# Patient Record
Sex: FEMALE | Race: WHITE | NOT HISPANIC OR LATINO | Employment: OTHER | URBAN - METROPOLITAN AREA
[De-identification: names, ages, dates, MRNs, and addresses within clinical notes are randomized per-mention and may not be internally consistent; named-entity substitution may affect disease eponyms.]

---

## 2019-11-19 ENCOUNTER — HISTORICAL (OUTPATIENT)
Dept: ADMINISTRATIVE | Facility: HOSPITAL | Age: 39
End: 2019-11-19

## 2019-11-21 LAB — FINAL CULTURE: NORMAL

## 2019-11-22 ENCOUNTER — HOSPITAL ENCOUNTER (OUTPATIENT)
Dept: OBSTETRICS AND GYNECOLOGY | Facility: HOSPITAL | Age: 39
End: 2019-11-23
Attending: OBSTETRICS & GYNECOLOGY | Admitting: OBSTETRICS & GYNECOLOGY

## 2019-11-22 LAB
ABS NEUT (OLG): 8.42 X10(3)/MCL (ref 2.1–9.2)
ALT SERPL-CCNC: 20 UNIT/L (ref 12–78)
AST SERPL-CCNC: 17 UNIT/L (ref 15–37)
BASOPHILS # BLD AUTO: 0.1 X10(3)/MCL (ref 0–0.2)
BASOPHILS NFR BLD AUTO: 0 %
EOSINOPHIL # BLD AUTO: 0.1 X10(3)/MCL (ref 0–0.9)
EOSINOPHIL NFR BLD AUTO: 1 %
ERYTHROCYTE [DISTWIDTH] IN BLOOD BY AUTOMATED COUNT: 13.2 % (ref 11.5–17)
GROUP & RH: NORMAL
HCT VFR BLD AUTO: 42.5 % (ref 37–47)
HGB BLD-MCNC: 14.4 GM/DL (ref 12–16)
LDH SERPL-CCNC: 114 UNIT/L (ref 84–246)
LYMPHOCYTES # BLD AUTO: 3.5 X10(3)/MCL (ref 0.6–4.6)
LYMPHOCYTES NFR BLD AUTO: 26 %
MCH RBC QN AUTO: 30.6 PG (ref 27–31)
MCHC RBC AUTO-ENTMCNC: 33.9 GM/DL (ref 33–36)
MCV RBC AUTO: 90.2 FL (ref 80–94)
MONOCYTES # BLD AUTO: 1.3 X10(3)/MCL (ref 0.1–1.3)
MONOCYTES NFR BLD AUTO: 10 %
NEUTROPHILS # BLD AUTO: 8.42 X10(3)/MCL (ref 2.1–9.2)
NEUTROPHILS NFR BLD AUTO: 62 %
PLATELET # BLD AUTO: 188 X10(3)/MCL (ref 130–400)
PMV BLD AUTO: 10.9 FL (ref 9.4–12.4)
PRODUCT READY: NORMAL
RBC # BLD AUTO: 4.71 X10(6)/MCL (ref 4.2–5.4)
URATE SERPL-MCNC: 4.4 MG/DL (ref 2.6–7.2)
WBC # SPEC AUTO: 13.5 X10(3)/MCL (ref 4.5–11.5)

## 2019-11-23 LAB
CREAT SERPL-MCNC: 0.55 MG/DL (ref 0.55–1.02)
CREAT UR-MCNC: 215 MG/DL
PROT 24H UR-MCNC: 61.6 MG/24HR (ref 0–165)

## 2020-07-29 LAB — POC BETA-HCG (QUAL): NEGATIVE

## 2022-04-10 ENCOUNTER — HISTORICAL (OUTPATIENT)
Dept: ADMINISTRATIVE | Facility: HOSPITAL | Age: 42
End: 2022-04-10

## 2022-04-25 VITALS
SYSTOLIC BLOOD PRESSURE: 138 MMHG | HEIGHT: 66 IN | DIASTOLIC BLOOD PRESSURE: 82 MMHG | BODY MASS INDEX: 46.61 KG/M2 | WEIGHT: 290 LBS

## 2022-04-30 NOTE — DISCHARGE SUMMARY
Patient:   Catrachita Jane            MRN: [id number]            FIN: [id number]               Age:   39 years     Sex:  Female     :  1980   Associated Diagnoses:   None   Author:   Grace Collazo      Basic Information   /Para:      Para Information:   : 5   Para Term: 3   Para : 0   Para Abortions: 1   Para Living: 3.       Chief Complaint   39 year old  at 31 5/7 weeks gestation with EDC of 20 by early US with unknown LMP. Her pregnancy is complicated by advanced maternal age, chronic hypertension, and type 2 diabetes. She was sent to [facility] to r/o superimposed preeclampsia after elevated blood pressures on 19 office visit. She is currently on no anti-hypertensives. She received Celestone on 19 and is to receive second dose today. She is on 74 units NPH and 42 units Humalog in am, 36 units Humalog before supper, and 54 units NPH at bedtime. She is also on metformin 1000mg bid. On 19 HgbA1C 6.3%. She is O negative (patient only did prenatal labs a few days ago after having been reminded several times to do them). She received Rhogam on 19. She voices no complaints. Denies vaginal bleeding, contractions, leaking of fluid or decreased fetal movement. Denies headaches, visual disturbances, or epigastric pain.     2019 14:32 CST     IUP @ 29wks 5 days, pre-e workup, 24hr urine      Interval History      Gestational Age (EGA) and ELAINE     * Note: EGA calculated as of 2019    ELAINE: 2020 EGA*: 29 weeks 6 days            Type: Authoritative Method Date: 2019          Method: Unknown (2019)        Confirmation: Confirmed        Description: --        Comments: --        Entered by: Odette Pérez RN on 2019     Other ELAINE Calculations for this Pregnancy:        No additional ELAINE calculations have been recorded for this pregnancy        Histories      Pregnancy History   (3,0,1,3)       Pregnancy #  1          Baby 1 Outcome Date: 1995  Outcome: Live Birth   Outcome or Result: Vaginal   Gender: -- Gest Age: 42 weeks              Wt: --   Hospital: -- [facility]: --   Child's Name: -- Baby's Father: --    Pregnancy # 2          Baby 1 Outcome Date:         Outcome: Fetal Death   Outcome or Result: Spontaneous    Gender: -- Gest Age: 12 weeks              Wt: --   Hospital: -- [facility]: --   Child's Name: -- Baby's Father: --    Pregnancy # 3          Baby 1 Outcome Date: 10/06/1998  Outcome: Live Birth   Outcome or Result: Vaginal   Gender: -- Gest Age: 37 weeks              Wt: --   Hospital: -- [facility]: --   Child's Name: -- Baby's Father: --    Pregnancy # 4          Baby 1 Outcome Date: 10/26/2000  Outcome: Live Birth   Outcome or Result: Vaginal   Gender: -- Gest Age: 37 weeks              Wt: --   Hospital: -- [facility]: --   Child's Name: -- Baby's Father: --  .   Prenatal History   Lab from flowsheet   2019 9:14 CST      POC CBG                   190 mg/dL  HI    2019 5:15 CST      POC CBG                   142 mg/dL  HI    2019 21:17 CST     POC CBG                   211 mg/dL  HI    2019 16:22 CST     POC CBG                   158 mg/dL  HI    2019 15:52 CST     Product Ready             1 RHIG ready    2019 15:07 CST     AST                       17 unit/L                             ALT                       20 unit/L                             LDH                       114 unit/L                             Uric Acid                 4.4 mg/dL                             WBC                       13.5 x10(3)/mcL  HI                             RBC                       4.71 x10(6)/mcL                             Hgb                       14.4 gm/dL                             Hct                       42.5 %                             Platelet                  188 x10(3)/mcL                             MCV                        90.2 fL                             MCH                       30.6 pg                             MCHC                      33.9 gm/dL                             RDW                       13.2 %                             MPV                       10.9 fL                             Abs Neut                  8.42 x10(3)/mcL                             Neutro Auto               62 %  NA                             Lymph Auto                26 %  NA                             Mono Auto                 10 %  NA                             Eos Auto                  1 %  NA                             Abs Eos                   0.1 x10(3)/mcL                             Basophil Auto             0 %  NA                             Abs Neutro                8.42 x10(3)/mcL                             Abs Lymph                 3.5 x10(3)/mcL                             Abs Mono                  1.3 x10(3)/mcL                             Abs Baso                  0.1 x10(3)/mcL                             ABO/Rh                    O NEG                             Antibody Screen           Neg        Physical Examination   Vital Signs   11/23/2019 7:28 CST      Temperature Temporal Artery               35.7 DegC  LOW                             Peripheral Pulse Rate     86 bpm                             Respiratory Rate          20 br/min                             Oxygen Therapy            Room air                             Systolic Blood Pressure   141 mmHg  HI                             Diastolic Blood Pressure  76 mmHg                             Mean Arterial Pressure, Cuff              98 mmHg                             Blood Pressure Location   Right arm                             Blood Pressure Cuff Size  Adult long    11/23/2019 4:20 CST      Respiratory Rate          24 br/min                             Systolic Blood Pressure   129 mmHg                             Diastolic Blood Pressure   66 mmHg                             Mean Arterial Pressure, Cuff              87 mmHg    11/23/2019 1:27 CST      Peripheral Pulse Rate     100 bpm                             Respiratory Rate          22 br/min                             Oxygen Therapy            Room air                             Systolic Blood Pressure   139 mmHg                             Diastolic Blood Pressure  77 mmHg    11/22/2019 19:42 CST     Temperature Temporal Artery               35.9 DegC  LOW                             Respiratory Rate          22 br/min                             Oxygen Therapy            Room air                             Systolic Blood Pressure   135 mmHg                             Diastolic Blood Pressure  83 mmHg                             Mean Arterial Pressure, Cuff              100 mmHg                             Blood Pressure Location   Right arm                             Blood Pressure Cuff Size  Adult long    11/22/2019 14:44 CST     Peripheral Pulse Rate     117 bpm  HI                             Respiratory Rate          20 br/min                             Oxygen Therapy            Room air                             Systolic Blood Pressure   118 mmHg                             Diastolic Blood Pressure  84 mmHg                             Blood Pressure Location   Right arm                             Blood Pressure Cuff Size  Adult long    11/22/2019 14:32 CST     Temperature Temporal Artery               36.0 DegC  LOW     General:  Alert and oriented, No acute distress.    Respiratory:  Respirations are non-labored, Symmetrical chest wall expansion.    Cardiovascular:  No edema.    Gastrointestinal:  Soft, Non-tender, + gross fetal movement, gravid.    Obstetric Exam     Uterus: non-tender.     Musculoskeletal:  DTRs + 2.    Integumentary:  Warm, Dry, Pink.    Neurologic:  Alert, Oriented.    Psychiatric:  Appropriate mood & affect.       Review / Management   Results review:  Lab  results   11/23/2019 9:14 CST      POC CBG                   190 mg/dL  HI    11/23/2019 5:15 CST      POC CBG                   142 mg/dL  HI    11/22/2019 21:17 CST     POC CBG                   211 mg/dL  HI    11/22/2019 16:22 CST     POC CBG                   158 mg/dL  HI    11/22/2019 15:52 CST     Product Ready             1 RHIG ready    11/22/2019 15:07 CST     AST                       17 unit/L                             ALT                       20 unit/L                             LDH                       114 unit/L                             Uric Acid                 4.4 mg/dL                             WBC                       13.5 x10(3)/mcL  HI                             RBC                       4.71 x10(6)/mcL                             Hgb                       14.4 gm/dL                             Hct                       42.5 %                             Platelet                  188 x10(3)/mcL                             MCV                       90.2 fL                             MCH                       30.6 pg                             MCHC                      33.9 gm/dL                             RDW                       13.2 %                             MPV                       10.9 fL                             Abs Neut                  8.42 x10(3)/mcL                             Neutro Auto               62 %  NA                             Lymph Auto                26 %  NA                             Mono Auto                 10 %  NA                             Eos Auto                  1 %  NA                             Abs Eos                   0.1 x10(3)/mcL                             Basophil Auto             0 %  NA                             Abs Neutro                8.42 x10(3)/mcL                             Abs Lymph                 3.5 x10(3)/mcL                             Abs Mono                  1.3 x10(3)/mcL                             Abs  Baso                  0.1 x10(3)/mcL                             ABO/Rh                    O NEG                             Antibody Screen           Neg  .       Impression and Plan   -chronic hypertension, on no meds. BPs stable. Labs on 11-22-19 platelets 188 AST 17 ALT 20   uric acid 4.4. showing no evidence of preeclampsia though 24 hour urine is in progress. Will get second dose of Celestone today. BPP/UAD this am with BPP 8/8. NST from yesterday evening reactive. Advised to report severe headaches, visual disturbances, epigastric pain.  -type 2 diabetes, on insulin and metformin. Sugars reviewed. Elevated sugars secondary to steroid effect. Will adjust to 82 NPH 50 Humalog in am, 48 Humalog before supper, and 66 NPH at bedtime.  Will have nurses check 1 hour after rescue dose of insulin as well.  She is currently on 1 unit for every 10mg > 150. With a sugar check of 151 about 1.5 hours after rescue dose of insulin, will adjust to 1 unit for every 8mg > 150.. Discussed with Dr. Chapin  -O negative blood type. She received Rhogam 11/23/19. Expectant management.  -If BPs remain stable, fetal heart tones reassuring, and labs ok, will likely discharge this evening after Celestone/24 hour urine is complete. She is to followup in office on Monday.

## 2022-04-30 NOTE — H&P
HISTORY:  The patient was admitted from the office to evaluate for pre-eclampsia.  Blood pressure was elevated.  She had no headaches, no vision problems, no epigastric pain.  Her initial blood pressure was 118/84, pulse was __________, respiratory rate 20.  Her initial laboratory evaluations are reassuring for lack of superimposed pre-eclampsia.       Her platelets are 188.  Her hemoglobin A1c on November 19, was 6.3.  Her blood type is Rh negative.  She has not taken RhoGAM and will give RhoGAM here in the hospital.  She had done prenatal labs after being told about 10 times to do them, finally, after 30 weeks on November 19.  Will be giving her steroids.  Will adjust the dose of insulin, monitor her sugars and plan to discharge her home tomorrow unless something changes.        ______________________________  MD ALEX Peña/UM  DD:  11/22/2019  Time:  04:18PM  DT:  11/22/2019  Time:  07:10PM  Job #:  [id number]    The H&P was reviewed, the patient was examined, and the following changes to the patients condition are noted:  ______________________________________________________________________________  ______________________________________________________________________________  ______________________________________________________________________________  [  ] No changes to the patient's condition:      ______________________________                                             ___________________  PHYSICIAN SIGNATURE                                                             DATE/TIME

## 2022-09-21 ENCOUNTER — HISTORICAL (OUTPATIENT)
Dept: ADMINISTRATIVE | Facility: HOSPITAL | Age: 42
End: 2022-09-21

## 2022-11-23 ENCOUNTER — HOSPITAL ENCOUNTER (EMERGENCY)
Facility: HOSPITAL | Age: 42
Discharge: HOME | End: 2022-11-23
Attending: EMERGENCY MEDICINE
Payer: MEDICAID

## 2022-11-23 VITALS
BODY MASS INDEX: 46.61 KG/M2 | DIASTOLIC BLOOD PRESSURE: 75 MMHG | HEART RATE: 98 BPM | OXYGEN SATURATION: 99 % | RESPIRATION RATE: 20 BRPM | TEMPERATURE: 99 F | HEIGHT: 66 IN | SYSTOLIC BLOOD PRESSURE: 134 MMHG | WEIGHT: 290 LBS

## 2022-11-23 DIAGNOSIS — K08.89 PAIN, DENTAL: Primary | ICD-10-CM

## 2022-11-23 DIAGNOSIS — K04.7 DENTAL INFECTION: ICD-10-CM

## 2022-11-23 LAB
APPEARANCE UR: ABNORMAL
B-HCG SERPL QL: NEGATIVE
BACTERIA #/AREA URNS AUTO: ABNORMAL /HPF
BILIRUB UR QL STRIP.AUTO: NEGATIVE MG/DL
COLOR UR AUTO: YELLOW
GLUCOSE UR QL STRIP.AUTO: NEGATIVE MG/DL
KETONES UR QL STRIP.AUTO: ABNORMAL MG/DL
LEUKOCYTE ESTERASE UR QL STRIP.AUTO: ABNORMAL UNIT/L
MUCOUS THREADS URNS QL MICRO: ABNORMAL /LPF
NITRITE UR QL STRIP.AUTO: NEGATIVE
PH UR STRIP.AUTO: 6 [PH]
PROT UR QL STRIP.AUTO: ABNORMAL MG/DL
RBC #/AREA URNS AUTO: ABNORMAL /HPF
RBC UR QL AUTO: NEGATIVE UNIT/L
SP GR UR STRIP.AUTO: >=1.03
SQUAMOUS #/AREA URNS AUTO: ABNORMAL /HPF
UROBILINOGEN UR STRIP-ACNC: 1 MG/DL
WBC #/AREA URNS AUTO: ABNORMAL /HPF

## 2022-11-23 PROCEDURE — 87088 URINE BACTERIA CULTURE: CPT | Performed by: NURSE PRACTITIONER

## 2022-11-23 PROCEDURE — 81025 URINE PREGNANCY TEST: CPT | Performed by: NURSE PRACTITIONER

## 2022-11-23 PROCEDURE — 81001 URINALYSIS AUTO W/SCOPE: CPT | Performed by: NURSE PRACTITIONER

## 2022-11-23 PROCEDURE — 99284 EMERGENCY DEPT VISIT MOD MDM: CPT

## 2022-11-23 PROCEDURE — 25000003 PHARM REV CODE 250: Performed by: EMERGENCY MEDICINE

## 2022-11-23 RX ORDER — AMOXICILLIN 500 MG/1
500 CAPSULE ORAL EVERY 12 HOURS
Qty: 20 CAPSULE | Refills: 0 | Status: SHIPPED | OUTPATIENT
Start: 2022-11-23 | End: 2022-12-03

## 2022-11-23 RX ORDER — BUSPIRONE HYDROCHLORIDE 15 MG/1
15 TABLET ORAL 3 TIMES DAILY
COMMUNITY
Start: 2022-10-25

## 2022-11-23 RX ORDER — HYDROCODONE BITARTRATE AND ACETAMINOPHEN 5; 325 MG/1; MG/1
1 TABLET ORAL EVERY 6 HOURS PRN
Qty: 12 TABLET | Refills: 0 | Status: SHIPPED | OUTPATIENT
Start: 2022-11-23

## 2022-11-23 RX ORDER — RISPERIDONE 3 MG/1
3 TABLET ORAL NIGHTLY
COMMUNITY
Start: 2022-11-21

## 2022-11-23 RX ORDER — ATORVASTATIN CALCIUM 10 MG/1
10 TABLET, FILM COATED ORAL DAILY
COMMUNITY
Start: 2022-11-21

## 2022-11-23 RX ORDER — TRAZODONE HYDROCHLORIDE 150 MG/1
150 TABLET ORAL NIGHTLY
COMMUNITY
Start: 2022-11-03

## 2022-11-23 RX ORDER — HYDROCODONE BITARTRATE AND ACETAMINOPHEN 5; 325 MG/1; MG/1
1 TABLET ORAL
Status: COMPLETED | OUTPATIENT
Start: 2022-11-23 | End: 2022-11-23

## 2022-11-23 RX ORDER — INSULIN GLARGINE 100 [IU]/ML
INJECTION, SOLUTION SUBCUTANEOUS
COMMUNITY
Start: 2022-10-12

## 2022-11-23 RX ORDER — METFORMIN HYDROCHLORIDE 1000 MG/1
1000 TABLET ORAL 2 TIMES DAILY
COMMUNITY
Start: 2022-11-21

## 2022-11-23 RX ORDER — SUMATRIPTAN 50 MG/1
TABLET, FILM COATED ORAL
COMMUNITY
Start: 2022-10-19

## 2022-11-23 RX ORDER — AMLODIPINE BESYLATE 5 MG/1
5 TABLET ORAL DAILY
COMMUNITY
Start: 2022-11-11

## 2022-11-23 RX ORDER — BENZTROPINE MESYLATE 1 MG/1
1 TABLET ORAL DAILY
COMMUNITY
Start: 2022-10-20

## 2022-11-23 RX ORDER — IBUPROFEN 800 MG/1
800 TABLET ORAL 2 TIMES DAILY PRN
COMMUNITY
Start: 2022-10-25

## 2022-11-23 RX ORDER — ALBUTEROL SULFATE 90 UG/1
AEROSOL, METERED RESPIRATORY (INHALATION)
COMMUNITY
Start: 2022-09-23

## 2022-11-23 RX ORDER — RISPERIDONE 1 MG/1
1 TABLET ORAL NIGHTLY
COMMUNITY
Start: 2022-11-21

## 2022-11-23 RX ORDER — LISINOPRIL 20 MG/1
20 TABLET ORAL DAILY
COMMUNITY
Start: 2022-11-03

## 2022-11-23 RX ORDER — DICLOFENAC SODIUM 75 MG/1
75 TABLET, DELAYED RELEASE ORAL 2 TIMES DAILY
COMMUNITY
Start: 2022-11-21

## 2022-11-23 RX ORDER — MEDROXYPROGESTERONE ACETATE 150 MG/ML
INJECTION, SUSPENSION INTRAMUSCULAR
COMMUNITY
Start: 2022-10-19

## 2022-11-23 RX ORDER — LIRAGLUTIDE 6 MG/ML
INJECTION SUBCUTANEOUS
COMMUNITY
Start: 2022-11-03

## 2022-11-23 RX ORDER — CLONAZEPAM 0.5 MG/1
0.5 TABLET ORAL 2 TIMES DAILY PRN
COMMUNITY
Start: 2022-11-14

## 2022-11-23 RX ADMIN — HYDROCODONE BITARTRATE AND ACETAMINOPHEN 1 TABLET: 5; 325 TABLET ORAL at 04:11

## 2022-11-23 NOTE — ED PROVIDER NOTES
"Encounter Date: 11/23/2022       History     Chief Complaint   Patient presents with    Dental Pain     Pt complaint of dental pain x 4 days relating "teeth are rotten"     Patient is a 43 yo F presenting with dental pain. Located to the right upper and lower side x 1 days of her frontal teeth. Swelling with jaw pain. No other complaints. She denies any fevers, chills, chest pain, shortness of breath, abdominal pain, nausea, vomiting, diarrhea or other complaints. They have otherwise been at their baseline health.         Review of patient's allergies indicates:  No Known Allergies  Past Medical History:   Diagnosis Date    Diabetes mellitus     Hypertension      No past surgical history on file.  No family history on file.     Review of Systems   All other systems reviewed and are negative.    Physical Exam     Initial Vitals [11/23/22 1541]   BP Pulse Resp Temp SpO2   (!) 145/98 (!) 119 18 98.6 °F (37 °C) 98 %      MAP       --         Physical Exam    Nursing note and vitals reviewed.  Constitutional: She appears well-developed and well-nourished. No distress.   HENT:   Head: Normocephalic and atraumatic.   No tongue swelling, elevation of floor of mouth or infection deeper into the jaw. Multiple dental caries with some erythema to the gum line of the upper right sided canines   Eyes: Conjunctivae are normal.   Neck: Neck supple.   Cardiovascular:  Normal rate, regular rhythm and normal heart sounds.           No murmur heard.  Pulmonary/Chest: Breath sounds normal. No respiratory distress. She has no wheezes. She has no rhonchi.   Musculoskeletal:         General: No edema. Normal range of motion.      Cervical back: Neck supple.     Neurological: She is alert and oriented to person, place, and time.   Skin: Skin is warm and dry.   Psychiatric: She has a normal mood and affect. Thought content normal.       ED Course   Procedures      Labs Reviewed                             All other components within normal " limits    Narrative:     We have not further evaluated these organisms because three or more species compatible with normal genital abhishek usually represents specimen contamination from the time of collection, rather than infection. If clinical circumstances warrant further workup of these organisms, please contact the Microbiology dept at [phone]; otherwise please have the patient submit another specimen.   URINALYSIS - Abnormal; Notable for the following components:    Appearance, UA SL CLOUDY (*)     Protein, UA Trace (*)     Ketones, UA Trace (*)     Leukocyte Esterase, UA Small (*)     All other components within normal limits   URINALYSIS, MICROSCOPIC - Abnormal; Notable for the following components:    Bacteria, UA Moderate (*)     Mucous, UA Moderate (*)     WBC, UA 21-50 (*)     Squamous Epithelial Cells, UA Many (*)     All other components within normal limits   PREGNANCY TEST, URINE RAPID - Normal        Imaging Results    None          Medications   HYDROcodone-acetaminophen 5-325 mg per tablet 1 tablet (1 tablet Oral Given 11/23/22 1605)                 ED Course as of 11/27/22 1403   Wed Nov 23, 2022   1606 Confirmed with patient that she has a ride and will not be driving. Discussed safety of taking norco with rita.  [GM]      ED Course User Index  [GM] Carina Silverman MD                 Clinical Impression:   Final diagnoses:  [K08.89] Pain, dental (Primary)  [K04.7] Dental infection      ED Disposition Condition    Discharge Stable          ED Prescriptions       Medication Sig Dispense Start Date End Date Auth. Provider    HYDROcodone-acetaminophen (NORCO) 5-325 mg per tablet Take 1 tablet by mouth every 6 (six) hours as needed for Pain. 12 tablet 11/23/2022 -- Carina Silverman MD    amoxicillin (AMOXIL) 500 MG capsule Take 1 capsule (500 mg total) by mouth every 12 (twelve) hours. for 10 days 20 capsule 11/23/2022 12/3/2022 Carina Silverman MD          Follow-up Information       Follow up With  Specialties Details Why Contact Info    Call dentist from clinic for follow up   If symptoms worsen, return to the ED.     [facility]   Call for follow up at [facility] primary care if you do not have a primary care doctor of your own. [address] [facility] [address]  [phone]               Carina Silverman MD  11/27/22 0291

## 2022-11-25 LAB
BACTERIA UR CULT: ABNORMAL

## 2025-01-13 ENCOUNTER — HOSPITAL ENCOUNTER (EMERGENCY)
Facility: HOSPITAL | Age: 45
Discharge: LEFT BEFORE DISCHARGE | End: 2025-01-14
Payer: MEDICAID

## 2025-01-13 VITALS
BODY MASS INDEX: 36.8 KG/M2 | TEMPERATURE: 98 F | SYSTOLIC BLOOD PRESSURE: 171 MMHG | RESPIRATION RATE: 16 BRPM | HEIGHT: 66 IN | HEART RATE: 87 BPM | WEIGHT: 229 LBS | DIASTOLIC BLOOD PRESSURE: 111 MMHG | OXYGEN SATURATION: 97 %

## 2025-01-13 LAB
ALBUMIN SERPL-MCNC: 3.7 G/DL (ref 3.5–5)
ALBUMIN/GLOB SERPL: 1.2 RATIO (ref 1.1–2)
ALP SERPL-CCNC: 78 UNIT/L (ref 40–150)
ALT SERPL-CCNC: 9 UNIT/L (ref 0–55)
ANION GAP SERPL CALC-SCNC: 9 MEQ/L
AST SERPL-CCNC: 13 UNIT/L (ref 5–34)
BASOPHILS # BLD AUTO: 0.05 X10(3)/MCL
BASOPHILS NFR BLD AUTO: 0.7 %
BILIRUB SERPL-MCNC: 1 MG/DL
BUN SERPL-MCNC: 5.2 MG/DL (ref 7–18.7)
CALCIUM SERPL-MCNC: 9.5 MG/DL (ref 8.4–10.2)
CHLORIDE SERPL-SCNC: 111 MMOL/L (ref 98–107)
CO2 SERPL-SCNC: 22 MMOL/L (ref 22–29)
CREAT SERPL-MCNC: 0.8 MG/DL (ref 0.55–1.02)
CREAT/UREA NIT SERPL: 7
EOSINOPHIL # BLD AUTO: 0.13 X10(3)/MCL (ref 0–0.9)
EOSINOPHIL NFR BLD AUTO: 1.8 %
ERYTHROCYTE [DISTWIDTH] IN BLOOD BY AUTOMATED COUNT: 12.6 % (ref 11.5–17)
GFR SERPLBLD CREATININE-BSD FMLA CKD-EPI: >60 ML/MIN/1.73/M2
GLOBULIN SER-MCNC: 3.2 GM/DL (ref 2.4–3.5)
GLUCOSE SERPL-MCNC: 109 MG/DL (ref 74–100)
HCT VFR BLD AUTO: 44.6 % (ref 37–47)
HGB BLD-MCNC: 15.8 G/DL (ref 12–16)
IMM GRANULOCYTES # BLD AUTO: 0.01 X10(3)/MCL (ref 0–0.04)
IMM GRANULOCYTES NFR BLD AUTO: 0.1 %
LYMPHOCYTES # BLD AUTO: 3.52 X10(3)/MCL (ref 0.6–4.6)
LYMPHOCYTES NFR BLD AUTO: 47.8 %
MCH RBC QN AUTO: 29.8 PG (ref 27–31)
MCHC RBC AUTO-ENTMCNC: 35.4 G/DL (ref 33–36)
MCV RBC AUTO: 84.2 FL (ref 80–94)
MONOCYTES # BLD AUTO: 0.73 X10(3)/MCL (ref 0.1–1.3)
MONOCYTES NFR BLD AUTO: 9.9 %
NEUTROPHILS # BLD AUTO: 2.92 X10(3)/MCL (ref 2.1–9.2)
NEUTROPHILS NFR BLD AUTO: 39.7 %
NRBC BLD AUTO-RTO: 0 %
PLATELET # BLD AUTO: 175 X10(3)/MCL (ref 130–400)
PMV BLD AUTO: 10.3 FL (ref 7.4–10.4)
POTASSIUM SERPL-SCNC: 3.2 MMOL/L (ref 3.5–5.1)
PROT SERPL-MCNC: 6.9 GM/DL (ref 6.4–8.3)
RBC # BLD AUTO: 5.3 X10(6)/MCL (ref 4.2–5.4)
SODIUM SERPL-SCNC: 142 MMOL/L (ref 136–145)
WBC # BLD AUTO: 7.36 X10(3)/MCL (ref 4.5–11.5)

## 2025-01-13 PROCEDURE — 80053 COMPREHEN METABOLIC PANEL: CPT | Performed by: STUDENT IN AN ORGANIZED HEALTH CARE EDUCATION/TRAINING PROGRAM

## 2025-01-13 PROCEDURE — 85025 COMPLETE CBC W/AUTO DIFF WBC: CPT | Performed by: STUDENT IN AN ORGANIZED HEALTH CARE EDUCATION/TRAINING PROGRAM

## 2025-01-13 PROCEDURE — 99900041 HC LEFT WITHOUT BEING SEEN- EMERGENCY

## 2025-04-21 ENCOUNTER — HOSPITAL ENCOUNTER (EMERGENCY)
Facility: HOSPITAL | Age: 45
Discharge: HOME | End: 2025-04-21
Attending: EMERGENCY MEDICINE
Payer: MEDICAID

## 2025-04-21 VITALS
WEIGHT: 218 LBS | DIASTOLIC BLOOD PRESSURE: 101 MMHG | BODY MASS INDEX: 35.03 KG/M2 | SYSTOLIC BLOOD PRESSURE: 168 MMHG | HEIGHT: 66 IN | HEART RATE: 88 BPM | TEMPERATURE: 98 F | OXYGEN SATURATION: 98 % | RESPIRATION RATE: 19 BRPM

## 2025-04-21 DIAGNOSIS — R10.9 ABDOMINAL PAIN, UNSPECIFIED ABDOMINAL LOCATION: Primary | ICD-10-CM

## 2025-04-21 LAB
ACCEPTIBLE SP GR UR QL: 1.01 (ref 1–1.03)
ALBUMIN SERPL-MCNC: 3.8 G/DL (ref 3.5–5)
ALBUMIN/GLOB SERPL: 1.2 RATIO (ref 1.1–2)
ALP SERPL-CCNC: 77 UNIT/L (ref 40–150)
ALT SERPL-CCNC: 17 UNIT/L (ref 0–55)
AMPHET UR QL SCN: NEGATIVE
ANION GAP SERPL CALC-SCNC: 9 MEQ/L
APTT PPP: 27.9 SECONDS (ref 23.4–33.9)
AST SERPL-CCNC: 22 UNIT/L (ref 11–45)
BACTERIA #/AREA URNS AUTO: ABNORMAL /HPF
BARBITURATE SCN PRESENT UR: NEGATIVE
BASOPHILS # BLD AUTO: 0.03 X10(3)/MCL
BASOPHILS NFR BLD AUTO: 0.8 %
BENZODIAZ UR QL SCN: NEGATIVE
BILIRUB SERPL-MCNC: 0.8 MG/DL
BILIRUB UR QL STRIP.AUTO: NEGATIVE
BUN SERPL-MCNC: 3.4 MG/DL (ref 7–18.7)
CALCIUM SERPL-MCNC: 9.3 MG/DL (ref 8.4–10.2)
CANNABINOIDS UR QL SCN: POSITIVE
CHLORIDE SERPL-SCNC: 111 MMOL/L (ref 98–107)
CLARITY UR: CLEAR
CO2 SERPL-SCNC: 22 MMOL/L (ref 22–29)
COCAINE UR QL SCN: NEGATIVE
COLOR UR AUTO: ABNORMAL
CREAT SERPL-MCNC: 0.68 MG/DL (ref 0.55–1.02)
CREAT/UREA NIT SERPL: 5
EOSINOPHIL # BLD AUTO: 0.05 X10(3)/MCL (ref 0–0.9)
EOSINOPHIL NFR BLD AUTO: 1.3 %
ERYTHROCYTE [DISTWIDTH] IN BLOOD BY AUTOMATED COUNT: 12.4 % (ref 11.5–17)
FENTANYL UR QL SCN: POSITIVE
GFR SERPLBLD CREATININE-BSD FMLA CKD-EPI: >60 ML/MIN/1.73/M2
GLOBULIN SER-MCNC: 3.1 GM/DL (ref 2.4–3.5)
GLUCOSE SERPL-MCNC: 97 MG/DL (ref 74–100)
GLUCOSE UR QL STRIP: NEGATIVE
HCT VFR BLD AUTO: 42 % (ref 37–47)
HGB BLD-MCNC: 14.7 G/DL (ref 12–16)
HGB UR QL STRIP: NEGATIVE
IMM GRANULOCYTES # BLD AUTO: 0.01 X10(3)/MCL (ref 0–0.04)
IMM GRANULOCYTES NFR BLD AUTO: 0.3 %
INR PPP: 1.3 (ref 2–3)
KETONES UR QL STRIP: 15
LACTATE SERPL-SCNC: 0.8 MMOL/L (ref 0.5–2.2)
LEUKOCYTE ESTERASE UR QL STRIP: ABNORMAL
LIPASE SERPL-CCNC: 8 U/L
LYMPHOCYTES # BLD AUTO: 1.65 X10(3)/MCL (ref 0.6–4.6)
LYMPHOCYTES NFR BLD AUTO: 43 %
MAGNESIUM SERPL-MCNC: 1.8 MG/DL (ref 1.6–2.6)
MCH RBC QN AUTO: 30.8 PG (ref 27–31)
MCHC RBC AUTO-ENTMCNC: 35 G/DL (ref 33–36)
MCV RBC AUTO: 87.9 FL (ref 80–94)
MDMA UR QL SCN: NEGATIVE
MONOCYTES # BLD AUTO: 0.59 X10(3)/MCL (ref 0.1–1.3)
MONOCYTES NFR BLD AUTO: 15.4 %
NEUTROPHILS # BLD AUTO: 1.51 X10(3)/MCL (ref 2.1–9.2)
NEUTROPHILS NFR BLD AUTO: 39.2 %
NITRITE UR QL STRIP: NEGATIVE
NRBC BLD AUTO-RTO: 0 %
OPIATES UR QL SCN: NEGATIVE
PCP UR QL: NEGATIVE
PH UR STRIP: 6 [PH]
PH UR: 6 [PH] (ref 3–11)
PLATELET # BLD AUTO: 145 X10(3)/MCL (ref 130–400)
PMV BLD AUTO: 10.6 FL (ref 7.4–10.4)
POTASSIUM SERPL-SCNC: 3.4 MMOL/L (ref 3.5–5.1)
PROT SERPL-MCNC: 6.9 GM/DL (ref 6.4–8.3)
PROT UR QL STRIP: NEGATIVE
PROTHROMBIN TIME: 16.3 SECONDS (ref 11.7–14.5)
RBC # BLD AUTO: 4.78 X10(6)/MCL (ref 4.2–5.4)
RBC #/AREA URNS AUTO: ABNORMAL /HPF
SODIUM SERPL-SCNC: 142 MMOL/L (ref 136–145)
SP GR UR STRIP.AUTO: 1.01 (ref 1–1.03)
SQUAMOUS #/AREA URNS AUTO: ABNORMAL /HPF
TROPONIN I SERPL-MCNC: <0.01 NG/ML (ref 0–0.04)
UROBILINOGEN UR STRIP-ACNC: 0.2
WBC # BLD AUTO: 3.84 X10(3)/MCL (ref 4.5–11.5)
WBC #/AREA URNS AUTO: ABNORMAL /HPF

## 2025-04-21 PROCEDURE — 85025 COMPLETE CBC W/AUTO DIFF WBC: CPT | Performed by: EMERGENCY MEDICINE

## 2025-04-21 PROCEDURE — 83735 ASSAY OF MAGNESIUM: CPT | Performed by: EMERGENCY MEDICINE

## 2025-04-21 PROCEDURE — 83605 ASSAY OF LACTIC ACID: CPT | Performed by: EMERGENCY MEDICINE

## 2025-04-21 PROCEDURE — 81003 URINALYSIS AUTO W/O SCOPE: CPT | Performed by: EMERGENCY MEDICINE

## 2025-04-21 PROCEDURE — 63600175 PHARM REV CODE 636 W HCPCS: Performed by: EMERGENCY MEDICINE

## 2025-04-21 PROCEDURE — 25000003 PHARM REV CODE 250: Performed by: EMERGENCY MEDICINE

## 2025-04-21 PROCEDURE — 85610 PROTHROMBIN TIME: CPT | Performed by: EMERGENCY MEDICINE

## 2025-04-21 PROCEDURE — 99285 EMERGENCY DEPT VISIT HI MDM: CPT | Mod: 25

## 2025-04-21 PROCEDURE — 80307 DRUG TEST PRSMV CHEM ANLYZR: CPT | Performed by: EMERGENCY MEDICINE

## 2025-04-21 PROCEDURE — 96375 TX/PRO/DX INJ NEW DRUG ADDON: CPT

## 2025-04-21 PROCEDURE — 80053 COMPREHEN METABOLIC PANEL: CPT | Performed by: EMERGENCY MEDICINE

## 2025-04-21 PROCEDURE — 85730 THROMBOPLASTIN TIME PARTIAL: CPT | Performed by: EMERGENCY MEDICINE

## 2025-04-21 PROCEDURE — 96374 THER/PROPH/DIAG INJ IV PUSH: CPT

## 2025-04-21 PROCEDURE — 25500020 PHARM REV CODE 255: Performed by: EMERGENCY MEDICINE

## 2025-04-21 PROCEDURE — 83690 ASSAY OF LIPASE: CPT | Performed by: EMERGENCY MEDICINE

## 2025-04-21 PROCEDURE — 84484 ASSAY OF TROPONIN QUANT: CPT | Performed by: EMERGENCY MEDICINE

## 2025-04-21 RX ORDER — NALOXONE HYDROCHLORIDE 4 MG/.1ML
SPRAY NASAL
Qty: 1 EACH | Refills: 11 | Status: SHIPPED | OUTPATIENT
Start: 2025-04-21

## 2025-04-21 RX ORDER — ONDANSETRON 4 MG/1
4 TABLET, ORALLY DISINTEGRATING ORAL EVERY 8 HOURS PRN
Qty: 14 TABLET | Refills: 0 | Status: SHIPPED | OUTPATIENT
Start: 2025-04-21

## 2025-04-21 RX ORDER — KETOROLAC TROMETHAMINE 30 MG/ML
30 INJECTION, SOLUTION INTRAMUSCULAR; INTRAVENOUS
Status: COMPLETED | OUTPATIENT
Start: 2025-04-21 | End: 2025-04-21

## 2025-04-21 RX ORDER — HYDROCODONE BITARTRATE AND ACETAMINOPHEN 10; 325 MG/1; MG/1
1 TABLET ORAL EVERY 6 HOURS PRN
Qty: 12 TABLET | Refills: 0 | Status: SHIPPED | OUTPATIENT
Start: 2025-04-21

## 2025-04-21 RX ORDER — NALOXONE HYDROCHLORIDE 4 MG/.1ML
SPRAY NASAL
Qty: 1 EACH | Refills: 11 | Status: SHIPPED | OUTPATIENT
Start: 2025-04-21 | End: 2025-04-21

## 2025-04-21 RX ORDER — FENTANYL CITRATE 50 UG/ML
100 INJECTION, SOLUTION INTRAMUSCULAR; INTRAVENOUS
Refills: 0 | Status: COMPLETED | OUTPATIENT
Start: 2025-04-21 | End: 2025-04-21

## 2025-04-21 RX ORDER — ONDANSETRON HYDROCHLORIDE 2 MG/ML
4 INJECTION, SOLUTION INTRAVENOUS
Status: COMPLETED | OUTPATIENT
Start: 2025-04-21 | End: 2025-04-21

## 2025-04-21 RX ORDER — LORAZEPAM 1 MG/1
1 TABLET ORAL
Status: COMPLETED | OUTPATIENT
Start: 2025-04-21 | End: 2025-04-21

## 2025-04-21 RX ORDER — HYDROCODONE BITARTRATE AND ACETAMINOPHEN 10; 325 MG/1; MG/1
1 TABLET ORAL EVERY 6 HOURS PRN
Qty: 12 TABLET | Refills: 0 | Status: SHIPPED | OUTPATIENT
Start: 2025-04-21 | End: 2025-04-21

## 2025-04-21 RX ADMIN — FENTANYL CITRATE 100 MCG: 50 INJECTION, SOLUTION INTRAMUSCULAR; INTRAVENOUS at 04:04

## 2025-04-21 RX ADMIN — ONDANSETRON 4 MG: 2 INJECTION INTRAMUSCULAR; INTRAVENOUS at 04:04

## 2025-04-21 RX ADMIN — LORAZEPAM 1 MG: 1 TABLET ORAL at 04:04

## 2025-04-21 RX ADMIN — IOHEXOL 100 ML: 350 INJECTION, SOLUTION INTRAVENOUS at 06:04

## 2025-04-21 RX ADMIN — KETOROLAC TROMETHAMINE 30 MG: 30 INJECTION, SOLUTION INTRAMUSCULAR; INTRAVENOUS at 06:04

## 2025-04-21 NOTE — ED PROVIDER NOTES
"Encounter Date: 4/21/2025       History     Chief Complaint   Patient presents with    Abdominal Pain     Pt states has been having abd pain and low back pain for the past four months. States hx of liver cirrhosis and is waiting for a transplant.     The history is provided by the patient.   Abdominal Pain  The current episode started several weeks ago. The onset of the illness was gradual. The problem has been gradually worsening. The abdominal pain is generalized. The abdominal pain does not radiate. The other symptoms of the illness include nausea. The other symptoms of the illness do not include fever, shortness of breath, vomiting, diarrhea or dysuria.   Symptoms associated with the illness do not include back pain.   Reports history of cirrhosis, fairly recent diagnosis, etiology unknown.  She has been referred to [facility] transplant in [facility] and has her first appointment on 5/2.  She has been having increasing generalized abdominal pain/bilateral flank pain x 4 months.  She has seen GI in [place] and no etiology of pain has been discovered.  States "the pain is driving me crazy."  Desires hospitalization.    Review of patient's allergies indicates:  No Known Allergies  Past Medical History:   Diagnosis Date    Diabetes mellitus     Hypertension      Past Surgical History:   Procedure Laterality Date    CHOLECYSTECTOMY       No family history on file.  Social History[1]  Review of Systems   Constitutional:  Negative for fever.   HENT:  Negative for sore throat.    Respiratory:  Negative for shortness of breath.    Cardiovascular:  Negative for chest pain.   Gastrointestinal:  Positive for abdominal pain and nausea. Negative for diarrhea and vomiting.   Genitourinary:  Negative for dysuria.   Musculoskeletal:  Negative for back pain.   Skin:  Negative for rash.   Neurological:  Negative for weakness.   Hematological:  Does not bruise/bleed easily.       Physical Exam     Initial Vitals [04/21/25 1547]   BP Pulse " Resp Temp SpO2   (!) 168/101 88 20 98.1 °F (36.7 °C) 98 %      MAP       --         Physical Exam    Nursing note and vitals reviewed.  Constitutional: She appears well-developed and well-nourished.   HENT:   Head: Normocephalic and atraumatic.   Right Ear: External ear normal.   Left Ear: External ear normal.   Eyes: Conjunctivae and EOM are normal. Pupils are equal, round, and reactive to light.   Neck: Neck supple.   Normal range of motion.  Cardiovascular:  Normal rate, regular rhythm, normal heart sounds and intact distal pulses.           Pulmonary/Chest: Breath sounds normal.   Abdominal: Abdomen is soft. Bowel sounds are normal. There is generalized abdominal tenderness.   obese There is no rebound and no guarding.   Musculoskeletal:         General: Normal range of motion.      Cervical back: Normal range of motion and neck supple.     Neurological: She is alert and oriented to person, place, and time. GCS score is 15. GCS eye subscore is 4. GCS verbal subscore is 5. GCS motor subscore is 6.   Skin: Skin is warm and dry. Capillary refill takes less than 2 seconds. There is pallor.   Psychiatric: Her speech is normal. Judgment and thought content normal. She is slowed. She exhibits a depressed mood.         ED Course   Procedures  Labs Reviewed   COMPREHENSIVE METABOLIC PANEL - Abnormal       Result Value    Sodium 142      Potassium 3.4 (*)     Chloride 111 (*)     CO2 22      Glucose 97      Blood Urea Nitrogen 3.4 (*)     Creatinine 0.68      Calcium 9.3      Protein Total 6.9      Albumin 3.8      Globulin 3.1      Albumin/Globulin Ratio 1.2      Bilirubin Total 0.8      ALP 77      ALT 17      AST 22      eGFR >60      Anion Gap 9.0      BUN/Creatinine Ratio 5     URINALYSIS, REFLEX TO URINE CULTURE - Abnormal    Color, UA Straw      Appearance, UA Clear      Specific Gravity, UA 1.015      pH, UA 6.0      Protein, UA Negative      Glucose, UA Negative      Ketones, UA 15 (*)     Blood, UA Negative       Bilirubin, UA Negative      Urobilinogen, UA 0.2      Nitrites, UA Negative      Leukocyte Esterase, UA Trace (*)    PROTIME-INR - Abnormal    PT 16.3 (*)     INR 1.3 (*)     Narrative:     Protimes are used to monitor anticoagulant agents such as warfarin. PT INR values are based on the current patient normal mean and the SARAH value for the specific instrument reagent used.  **Routine theraputic target values for the INR are 2.0-3.0**   CBC WITH DIFFERENTIAL - Abnormal    WBC 3.84 (*)     RBC 4.78      Hgb 14.7      Hct 42.0      MCV 87.9      MCH 30.8      MCHC 35.0      RDW 12.4      Platelet 145      MPV 10.6 (*)     Neut % 39.2      Lymph % 43.0      Mono % 15.4      Eos % 1.3      Basophil % 0.8      Imm Grans % 0.3      Neut # 1.51 (*)     Lymph # 1.65      Mono # 0.59      Eos # 0.05      Baso # 0.03      Imm Gran # 0.01      NRBC% 0.0     URINALYSIS, MICROSCOPIC - Abnormal    Bacteria, UA Occasional      RBC, UA None Seen      WBC, UA 0-5      Squamous Epithelial Cells, UA Few (*)    DRUG SCREEN, URINE (BEAKER) - Abnormal    Amphetamines, Urine Negative      Barbiturates, Urine Negative      Benzodiazepine, Urine Negative      Cannabinoids, Urine Positive (*)     Cocaine, Urine Negative      Fentanyl, Urine Positive (*)     MDMA, Urine Negative      Opiates, Urine Negative      Phencyclidine, Urine Negative      pH, Urine 6.0      Specific Gravity, Urine Auto 1.015      Narrative:     Cut off concentrations:    Amphetamines - 1000 ng/ml  Barbiturates - 200 ng/ml  Benzodiazepine - 200 ng/ml  Cannabinoids (THC) - 50 ng/ml  Cocaine - 300 ng/ml  Fentanyl - 1.0 ng/ml  MDMA - 500 ng/ml  Opiates - 300 ng/ml   Phencyclidine (PCP) - 25 ng/ml    Specimen submitted for drug analysis and tested for pH and specific gravity in order to evaluate sample integrity. Suspect tampering if specific gravity is <1.003 and/or pH is not within the range of 4.5 - 8.0  False negatives may result form substances such as bleach added to  urine.  False positives may result for the presence of a substance with similar chemical structure to the drug or its metabolite.    This test provides only a PRELIMINARY analytical test result. A more specific alternate chemical method must be used in order to obtain a confirmed analytical result. Gas chromatography/mass spectrometry (GC/MS) is the preferred confirmatory method. Other chemical confirmation methods are available. Clinical consideration and professional judgement should be applied to any drug of abuse test result, particularly when preliminary positive results are used.    Positive results will be confirmed only at the physicians request. Unconfirmed screening results are to be used only for medical purposes (treatment).        LACTIC ACID, PLASMA - Normal    Lactic Acid Level 0.8     LIPASE - Normal    Lipase Level 8     MAGNESIUM - Normal    Magnesium Level 1.80     TROPONIN I - Normal    Troponin-I <0.010     APTT - Normal    PTT 27.9     CBC W/ AUTO DIFFERENTIAL    Narrative:     The following orders were created for panel order CBC auto differential.  Procedure                               Abnormality         Status                     ---------                               -----------         ------                     CBC with Differential[[id number]]       Abnormal            Final result                 Please view results for these tests on the individual orders.          Imaging Results              CT Abdomen Pelvis With IV Contrast NO Oral Contrast (Final result)  Result time 04/21/25 19:08:54      Final result by Gaston Lazo MD (04/21/25 19:08:54)                   Impression:      Cirrhosis with hepatosplenomegaly and small volume ascites.      Electronically signed by: Gaston Lazo  Date:    04/21/2025  Time:    19:08               Narrative:    EXAMINATION:  CT ABDOMEN PELVIS WITH IV CONTRAST    CLINICAL HISTORY:  Abdominal pain, acute, nonlocalized;    TECHNIQUE:  CT  imaging of the abdomen and pelvis after intravenous contrast. Dose length product 751 mGycm. Automatic exposure control, adjustment of mA/kV or iterative reconstruction technique used to limit radiation dose.    COMPARISON:  CT 11/26/2021    FINDINGS:  Liver/biliary: Cirrhosis with hepatomegaly measuring 21 cm.  Millimetric hypodensity in the superior right liver (series 2, image 18) too small to fully evaluate, but statistically benign.  Gallbladder not seen.  No biliary dilatation.    Pancreas: Normal.    Spleen: Splenomegaly measures 16 cm.    Adrenals: Mild thickening of the medial limb of the left adrenal stable since 2021.    Genitourinary: Symmetric renal enhancement. No hydronephrosis. Bladder within normal limits. No pelvic mass.    Stomach/bowel: No evidence of bowel obstruction. Normal appendix. No definitive sites of bowel inflammation.    Lymph nodes/peritoneum: Few borderline enlarged periportal lymph nodes stable since 2021. Small volume pelvic ascites.    Vasculature: Mild aortic and iliac artery calcifications.  No abdominal aortic aneurysm.  Patent SMV, splenic vein and main portal vein.    Abdominal wall: No concerning findings.    Lung bases: No consolidation or pleural effusion.    Musculoskeletal: No acute osseous findings.                                       Medications   fentaNYL injection 100 mcg (100 mcg Intravenous Given 4/21/25 1618)   ondansetron injection 4 mg (4 mg Intravenous Given 4/21/25 1618)   LORazepam tablet 1 mg (1 mg Oral Given 4/21/25 1628)   ketorolac injection 30 mg (30 mg Intravenous Given 4/21/25 1815)   iohexoL (OMNIPAQUE 350) injection 100 mL (100 mLs Intravenous Given 4/21/25 1853)     Medical Decision Making  Patient signed out to me at shift change.  I have seen and evaluated the patient.  She is complaining of this chronic abdominal pain that she is convinced is due to her being on Ozempic in the past because she is still having the same abdominal pain and nausea  "and vomiting as she was whenever she started the Ozempic.  I encouraged her to get into pain management and keep her appointments with the hepatologist, as it appears this pain is not just going to quickly go away.  She is on benzodiazepines so I will also prescribe a prescription for Narcan which she states she is aware of how it is used and the indications for its use because she "had a daughter that almost  from overdose."I counseled patient on risks associated with opioid medication including, but not limited to, physical dependence and/or addiction, confusion, constipation, drowsiness, nausea or vomiting, impairment in driving and/or operating machinery. I also advised the patient that taking more opioids than prescribed or mixing with other central nervous system depressants/sedatives, such as benzodiazepines or alcohol, can result in respiratory depression, hypoxic brain injury, coma, or death.  Given strict ED return precautions. I have spoken with the patient and/or caregivers. I have explained the patient's condition, diagnoses and treatment plan based on the information available to me at this time. I have answered the patient's and/or caregiver's questions and addressed any concerns. The patient and/or caregivers have as good an understanding of the patient's diagnosis, condition and treatment plan as can be expected at this point. The vital signs have been stable. The patient's condition is stable and appropriate for discharge from the emergency department.     The patient will pursue further outpatient evaluation with the primary care physician or other designated or consulting physician as outlined in the discharge instructions. The patient and/or caregivers are agreeable to this plan of care and follow-up instructions have been explained in detail. The patient and/or caregivers have received these instructions in written format and have expressed an understanding of the discharge instructions. " The patient and/or caregivers are aware that any significant change in condition or worsening of symptoms should prompt an immediate return to this or the closest emergency department or a call to 911.      Amount and/or Complexity of Data Reviewed  Labs: ordered. Decision-making details documented in ED Course.  Radiology: ordered.    Risk  Prescription drug management.  Parenteral controlled substances.    Differential includes:  cirrhosis, ascites, cancer, IBS, IBD, pancreatitis, anemia, dehydration, electrolyte disturbance.  Will obtain CBC, CMP, lipase mag, PT/PTT, troponin, UA.  Consider CT imaging.  Will give analgesic.  She tells RN that she has been out of her Ativan for 2 days and is requesting a dose of Ativan for her anxiety.  She strongly desires hospitalization, though I am doubtful I will find an indication for such...           ED Course as of 04/21/25 1927 Mon Apr 21, 2025 1805 Patient's MELD 3.0 score is 10, which equates to a 99.1% estimated 90-day survival.  I do not have the details concerning her liver disease but by today's lab results, it would appear that it is not a severe as she made it out to be. [CL]   1811 Discussed results with patient.  She states she is in pain again.  Will obtain CT abdomen/pelvis, as I do not have access to any recent imaging studies.  Concern for chronic pain syndrome or drug-seeking behavior.  I would not be surprised if her CT is unremarkable. [CL]   1827  reviewed.  Numerous controlled substances from numerous providers filled at several different pharmacies. [CL]   1855 WBC(!): 3.84 [IB]   1855 Hemoglobin: 14.7 [IB]   1855 Platelet Count: 145 [IB]   1856 Creatinine: 0.68 [IB]   1856 Lipase: 8 [IB]   1856 Troponin I: <0.010 [IB]   1856 Magnesium : 1.80 [IB]   1856 Lactic Acid Level: 0.8 [IB]   1856 INR(!): 1.3 [IB]      ED Course User Index  [CL] Mj Orta MD  [IB] Raghavendra Hines DO                           Clinical Impression:  Final  diagnoses:  [R10.9] Abdominal pain, unspecified abdominal location (Primary)          ED Disposition Condition    Discharge Good          ED Prescriptions       Medication Sig Dispense Start Date End Date Auth. Provider    ondansetron (ZOFRAN-ODT) 4 MG TbDL Take 1 tablet (4 mg total) by mouth every 8 (eight) hours as needed (nausea and vomiting). 14 tablet 4/21/2025 -- Raghavendra Hines DO    HYDROcodone-acetaminophen (NORCO)  mg per tablet Take 1 tablet by mouth every 6 (six) hours as needed for Pain. 12 tablet 4/21/2025 -- Raghavendra Hines DO    naloxone (NARCAN) 4 mg/actuation Spry 4mg by nasal route as needed for opioid overdose; may repeat every 2-3 minutes in alternating nostrils until medical help arrives. Call 911 1 each 4/21/2025 -- Raghavendra Hines DO          Follow-up Information       Follow up With Specialties Details Why Contact Info    [facility] - Emergency Dept Emergency Medicine  If symptoms worsen [id number]  [facility] [id number] [phone]                 [1]   Social History  Tobacco Use    Smoking status: Some Days     Types: Cigarettes    Smokeless tobacco: Never        Raghavendra Hines DO  04/21/25 1927